# Patient Record
Sex: MALE | Race: WHITE
[De-identification: names, ages, dates, MRNs, and addresses within clinical notes are randomized per-mention and may not be internally consistent; named-entity substitution may affect disease eponyms.]

---

## 2020-04-13 ENCOUNTER — HOSPITAL ENCOUNTER (OUTPATIENT)
Dept: HOSPITAL 53 - M ED | Age: 25
Discharge: HOME | End: 2020-04-13
Attending: SURGERY | Admitting: SURGERY
Payer: COMMERCIAL

## 2020-04-13 VITALS — DIASTOLIC BLOOD PRESSURE: 60 MMHG | SYSTOLIC BLOOD PRESSURE: 122 MMHG

## 2020-04-13 VITALS — SYSTOLIC BLOOD PRESSURE: 127 MMHG | DIASTOLIC BLOOD PRESSURE: 60 MMHG

## 2020-04-13 VITALS — DIASTOLIC BLOOD PRESSURE: 66 MMHG | SYSTOLIC BLOOD PRESSURE: 128 MMHG

## 2020-04-13 VITALS — SYSTOLIC BLOOD PRESSURE: 129 MMHG | DIASTOLIC BLOOD PRESSURE: 66 MMHG

## 2020-04-13 VITALS — DIASTOLIC BLOOD PRESSURE: 67 MMHG | SYSTOLIC BLOOD PRESSURE: 133 MMHG

## 2020-04-13 VITALS — SYSTOLIC BLOOD PRESSURE: 119 MMHG | DIASTOLIC BLOOD PRESSURE: 77 MMHG

## 2020-04-13 VITALS — BODY MASS INDEX: 29.68 KG/M2 | HEIGHT: 69 IN | WEIGHT: 200.4 LBS

## 2020-04-13 DIAGNOSIS — K35.890: Primary | ICD-10-CM

## 2020-04-13 LAB
ALBUMIN SERPL BCG-MCNC: 4 GM/DL (ref 3.2–5.2)
ALT SERPL W P-5'-P-CCNC: 32 U/L (ref 12–78)
BASOPHILS # BLD AUTO: 0.1 10^3/UL (ref 0–0.2)
BASOPHILS NFR BLD AUTO: 0.4 % (ref 0–1)
BILIRUB CONJ SERPL-MCNC: 0.3 MG/DL (ref 0–0.2)
BILIRUB SERPL-MCNC: 1.2 MG/DL (ref 0.2–1)
EOSINOPHIL # BLD AUTO: 0.2 10^3/UL (ref 0–0.5)
EOSINOPHIL NFR BLD AUTO: 1.3 % (ref 0–3)
HCT VFR BLD AUTO: 44.1 % (ref 42–52)
HGB BLD-MCNC: 15.5 G/DL (ref 13.5–17.5)
LIPASE SERPL-CCNC: 184 U/L (ref 73–393)
LYMPHOCYTES # BLD AUTO: 1.6 10^3/UL (ref 1.5–5)
LYMPHOCYTES NFR BLD AUTO: 11.4 % (ref 24–44)
MCH RBC QN AUTO: 29.8 PG (ref 27–33)
MCHC RBC AUTO-ENTMCNC: 35.1 G/DL (ref 32–36.5)
MCV RBC AUTO: 84.6 FL (ref 80–96)
MONOCYTES # BLD AUTO: 1.1 10^3/UL (ref 0–0.8)
MONOCYTES NFR BLD AUTO: 7.5 % (ref 0–5)
NEUTROPHILS # BLD AUTO: 11.2 10^3/UL (ref 1.5–8.5)
NEUTROPHILS NFR BLD AUTO: 79 % (ref 36–66)
PLATELET # BLD AUTO: 200 10^3/UL (ref 150–450)
PROT SERPL-MCNC: 7 GM/DL (ref 6.4–8.2)
RBC # BLD AUTO: 5.21 10^6/UL (ref 4.3–6.1)
WBC # BLD AUTO: 14.2 10^3/UL (ref 4–10)

## 2020-04-13 PROCEDURE — 96361 HYDRATE IV INFUSION ADD-ON: CPT

## 2020-04-13 PROCEDURE — 99285 EMERGENCY DEPT VISIT HI MDM: CPT

## 2020-04-13 PROCEDURE — 96375 TX/PRO/DX INJ NEW DRUG ADDON: CPT

## 2020-04-13 PROCEDURE — 96367 TX/PROPH/DG ADDL SEQ IV INF: CPT

## 2020-04-13 PROCEDURE — 88304 TISSUE EXAM BY PATHOLOGIST: CPT

## 2020-04-13 PROCEDURE — 90686 IIV4 VACC NO PRSV 0.5 ML IM: CPT

## 2020-04-13 PROCEDURE — 44970 LAPAROSCOPY APPENDECTOMY: CPT

## 2020-04-13 PROCEDURE — 83690 ASSAY OF LIPASE: CPT

## 2020-04-13 PROCEDURE — 85025 COMPLETE CBC W/AUTO DIFF WBC: CPT

## 2020-04-13 PROCEDURE — 90471 IMMUNIZATION ADMIN: CPT

## 2020-04-13 PROCEDURE — 96366 THER/PROPH/DIAG IV INF ADDON: CPT

## 2020-04-13 PROCEDURE — 96365 THER/PROPH/DIAG IV INF INIT: CPT

## 2020-04-13 PROCEDURE — 80076 HEPATIC FUNCTION PANEL: CPT

## 2020-04-13 PROCEDURE — 80047 BASIC METABLC PNL IONIZED CA: CPT

## 2020-04-13 PROCEDURE — 74177 CT ABD & PELVIS W/CONTRAST: CPT

## 2020-04-13 RX ADMIN — SODIUM CHLORIDE, POTASSIUM CHLORIDE, SODIUM LACTATE AND CALCIUM CHLORIDE SCH MLS/HR: 600; 310; 30; 20 INJECTION, SOLUTION INTRAVENOUS at 03:04

## 2020-04-13 RX ADMIN — SODIUM CHLORIDE, POTASSIUM CHLORIDE, SODIUM LACTATE AND CALCIUM CHLORIDE SCH MLS/HR: 600; 310; 30; 20 INJECTION, SOLUTION INTRAVENOUS at 11:31

## 2020-04-13 NOTE — RO
DATE OF PROCEDURE:  04/13/2020

 

PREOPERATIVE DIAGNOSIS:  Acute appendicitis.

 

POSTOPERATIVE DIAGNOSIS:  Acute appendicitis.

 

PROCEDURE PERFORMED:  Laparoscopic appendectomy.

 

SURGEON:  William Jimenez MD

 

ASSISTANT:

 

ANESTHESIA:  General.

 

INDICATIONS FOR THE PROCEDURE:  The patient is a 24-year-old man who developed

some abdominal pain with nausea and vomiting late on the evening of 04/12/2020.

His discomfort persisted and became localized in the right side of the abdomen,

and he presented to the emergency department for evaluation.  He was found to

have an elevated white count of 14,000 with tenderness in the right lower

quadrant, and a CT scan showed a dilated appendix with an appendicolith

consistent with early appendicitis.  He is now for a laparoscopic appendectomy.

 

OPERATIVE PROCEDURE:  The patient was brought to the operating room and placed on

the table in a supine position.  He was placed under general endotracheal

anesthesia.  The patient's abdomen was prepped and draped in a sterile fashion.

0.25% Marcaine was infiltrated at each of the trocar sites as needed.  A short

supraumbilical midline incision was made and deepened into the subcutaneous

tissues.  A Veress needle was inserted; and after a positive hanging drop test,

the abdomen was insufflated with carbon dioxide gas.  The Veress needle was

removed, and the fascia was incised along the midline; and a 12-mm port was

placed without difficulty.  Initial examination showed a normal-appearing liver

and gallbladder.  Visualized portions of the anterior stomach and the small and

large bowel were normal.  The patient was tilted to a Trendelenburg position and

rolled to the left.  Two 5-mm ports were placed in the left lower quadrant.

Graspers were inserted.  The cecum was identified, and the appendix was

identified curving inferiorly from the cecum.  The mesoappendix was grasped, and

the appendix was elevated.  The entire appendix down to its base was markedly

dilated and thickened with a small amount of exudate noted.  The mesoappendix was

carefully divided using the hook cautery with care to cauterize the vessels well

before they were divided.  The base of the appendix was identified.  A 0 Vicryl

Endoloop was placed around the base of the appendix at its juncture with the

cecum.  A second Endoloop was placed approximately a centimeter to a centimeter

further out on the appendix, and the appendix was transected between them.  The

appendix was placed in an Endopouch.  The exposed mucosa of the appendiceal stump

was gently cauterized.  The right lower quadrant was irrigated and inspected, and

there was no evidence of bleeding; and the appendiceal stump looked good.  The

patient was returned to a flat position.  The abdomen was deflated and the

trocars all removed.  The appendix was recovered through the supraumbilical site

and sent for permanent pathology.  The fascia at the supraumbilical site was

closed with interrupted simple sutures of 2-0 Vicryl.  The skin incisions were

all closed with buried 4-0 Vicryl and Steri-Strips.  The patient tolerated the

procedure well.  Light dressings were applied.  He was awakened in the operating

room, extubated, and moved to the recovery room in stable condition.

## 2020-04-13 NOTE — REPVR
PROCEDURE INFORMATION: 

Exam: CT Abdomen And Pelvis With Contrast 

Exam date and time: 4/13/2020 1:57 AM 

Age: 24 years old 

Clinical indication: Abdominal pain; Additional info: Lower abd pain, vomiting, 

diarrhea 



TECHNIQUE: 

Imaging protocol: Computed tomography of the abdomen and pelvis with 

intravenous contrast. 

Radiation optimization: All CT scans at this facility use at least one of these 

dose optimization techniques: automated exposure control; mA and/or kV 

adjustment per patient size (includes targeted exams where dose is matched to 

clinical indication); or iterative reconstruction. 

Contrast material: ; Contrast volume: 100 ml; Contrast route: IV;  



COMPARISON: 

No relevant prior studies available. 



FINDINGS: 



Liver: Normal. No mass. 

Gallbladder and bile ducts: Normal. No calcified stones. No ductal dilation. 

Pancreas: Normal. No ductal dilation. 

Spleen: Normal. No splenomegaly. 

Adrenals: Normal. No mass. 

Kidneys and ureters: Normal. No hydronephrosis. 

Stomach and bowel: Unremarkable. No obstruction. No mucosal thickening. 

Appendix: Enlarged appendix measuring 1 cm with minimal periappendiceal 

stranding, and 7 mm appendicolith. 

Intraperitoneal space: Unremarkable. No free air. No significant fluid 

collection. 

Vasculature: Unremarkable. No abdominal aortic aneurysm. 

Lymph nodes: Unremarkable. No enlarged lymph nodes. 



Bladder: Unremarkable as visualized. 

Reproductive: Unremarkable as visualized. 

Bones/joints: Unremarkable. No acute fracture. 

Soft tissues: Unremarkable. 



IMPRESSION: 

Enlarged appendix measuring 1 cm with minimal periappendiceal stranding, and 7 

mm appendicolith. Evidence for acute appendicitis without rupture.



Electronically signed by: Lev Bello On 04/13/2020  02:15:03 AM